# Patient Record
Sex: FEMALE | Race: WHITE | NOT HISPANIC OR LATINO | Employment: OTHER | URBAN - METROPOLITAN AREA
[De-identification: names, ages, dates, MRNs, and addresses within clinical notes are randomized per-mention and may not be internally consistent; named-entity substitution may affect disease eponyms.]

---

## 2019-11-01 DIAGNOSIS — Z00.6 RESEARCH STUDY PATIENT: ICD-10-CM

## 2019-11-01 NOTE — PROGRESS NOTES
[facility] [place] Cardiac Calcium CT Trial:  Ms. Muñiz contacted via telephone by Coco Smallwood, study team member for enrollment in the above stated trial. Informed consent form reviewed, questions answered.  Consent signed via [employer] by Ms. Muñiz on 10/31/2019 and witnessed by Coco Smallwood on 11/01/2019.  Order for CT scan placed in [facility].

## 2019-11-05 ENCOUNTER — HOSPITAL ENCOUNTER (OUTPATIENT)
Dept: RADIOLOGY | Facility: MEDICAL CENTER | Age: 54
End: 2019-11-05
Attending: INTERNAL MEDICINE

## 2019-11-05 DIAGNOSIS — Z00.6 RESEARCH STUDY PATIENT: ICD-10-CM

## 2019-11-05 PROCEDURE — 4410556 CT-CARDIAC SCORING

## 2019-11-07 ENCOUNTER — TELEPHONE (OUTPATIENT)
Dept: CARDIOLOGY | Facility: MEDICAL CENTER | Age: 54
End: 2019-11-07

## 2019-11-08 NOTE — TELEPHONE ENCOUNTER
[facility] [place] Cardiac Calcium Scoring CT study:  Spoke with Ms. Muñiz today, 11-7-19 to discuss results of CT Cardiac Calcium study as reviewed by Dr. Tabares.    Findings:   Calcium score: 0   Please see complete report in Imaging.      IMPRESSION: No coronary artery plaque identified.  Consider repeat testing in 5 years.  No indication for medical therapy    Encouraged to contact study team with any questions.

## 2019-12-05 PROBLEM — Z00.6 RESEARCH STUDY PATIENT: Status: RESOLVED | Noted: 2019-11-01 | Resolved: 2019-12-05

## 2021-03-19 PROBLEM — M81.0 OSTEOPOROSIS, POST-MENOPAUSAL: Status: ACTIVE | Noted: 2021-03-19
